# Patient Record
Sex: MALE | Race: WHITE | HISPANIC OR LATINO | Employment: FULL TIME | URBAN - METROPOLITAN AREA
[De-identification: names, ages, dates, MRNs, and addresses within clinical notes are randomized per-mention and may not be internally consistent; named-entity substitution may affect disease eponyms.]

---

## 2019-09-17 ENCOUNTER — OFFICE VISIT (OUTPATIENT)
Dept: URGENT CARE | Facility: MEDICAL CENTER | Age: 34
End: 2019-09-17
Payer: COMMERCIAL

## 2019-09-17 ENCOUNTER — HOSPITAL ENCOUNTER (EMERGENCY)
Facility: HOSPITAL | Age: 34
Discharge: HOME | End: 2019-09-17
Attending: EMERGENCY MEDICINE | Admitting: EMERGENCY MEDICINE
Payer: COMMERCIAL

## 2019-09-17 ENCOUNTER — APPOINTMENT (EMERGENCY)
Dept: CT IMAGING | Facility: HOSPITAL | Age: 34
End: 2019-09-17
Payer: COMMERCIAL

## 2019-09-17 VITALS
WEIGHT: 203.6 LBS | HEART RATE: 87 BPM | TEMPERATURE: 98.4 F | RESPIRATION RATE: 16 BRPM | BODY MASS INDEX: 30.86 KG/M2 | SYSTOLIC BLOOD PRESSURE: 136 MMHG | OXYGEN SATURATION: 98 % | DIASTOLIC BLOOD PRESSURE: 87 MMHG | HEIGHT: 68 IN

## 2019-09-17 VITALS
WEIGHT: 204.8 LBS | BODY MASS INDEX: 31.04 KG/M2 | OXYGEN SATURATION: 98 % | TEMPERATURE: 99.1 F | RESPIRATION RATE: 18 BRPM | HEIGHT: 68 IN | SYSTOLIC BLOOD PRESSURE: 124 MMHG | DIASTOLIC BLOOD PRESSURE: 94 MMHG | HEART RATE: 84 BPM

## 2019-09-17 DIAGNOSIS — R10.31 RIGHT LOWER QUADRANT ABDOMINAL PAIN: Primary | ICD-10-CM

## 2019-09-17 DIAGNOSIS — R11.0 NAUSEA: ICD-10-CM

## 2019-09-17 DIAGNOSIS — R10.9 ABDOMINAL PAIN: Primary | ICD-10-CM

## 2019-09-17 LAB
ALBUMIN SERPL BCP-MCNC: 3.6 G/DL (ref 3.5–5)
ALP SERPL-CCNC: 51 U/L (ref 46–116)
ALT SERPL W P-5'-P-CCNC: 126 U/L (ref 12–78)
ANION GAP SERPL CALCULATED.3IONS-SCNC: 9 MMOL/L (ref 4–13)
AST SERPL W P-5'-P-CCNC: 40 U/L (ref 5–45)
BASOPHILS # BLD AUTO: 0.06 THOUSANDS/ΜL (ref 0–0.1)
BASOPHILS NFR BLD AUTO: 1 % (ref 0–1)
BILIRUB DIRECT SERPL-MCNC: 0.11 MG/DL (ref 0–0.2)
BILIRUB SERPL-MCNC: 0.3 MG/DL (ref 0.2–1)
BILIRUB UR QL STRIP: NEGATIVE
BUN SERPL-MCNC: 17 MG/DL (ref 5–25)
CALCIUM SERPL-MCNC: 8.5 MG/DL (ref 8.3–10.1)
CHLORIDE SERPL-SCNC: 102 MMOL/L (ref 100–108)
CLARITY UR: CLEAR
CO2 SERPL-SCNC: 26 MMOL/L (ref 21–32)
COLOR UR: YELLOW
CREAT SERPL-MCNC: 1.14 MG/DL (ref 0.6–1.3)
EOSINOPHIL # BLD AUTO: 0.07 THOUSAND/ΜL (ref 0–0.61)
EOSINOPHIL NFR BLD AUTO: 1 % (ref 0–6)
ERYTHROCYTE [DISTWIDTH] IN BLOOD BY AUTOMATED COUNT: 12.7 % (ref 11.6–15.1)
GFR SERPL CREATININE-BSD FRML MDRD: 84 ML/MIN/1.73SQ M
GLUCOSE SERPL-MCNC: 112 MG/DL (ref 65–140)
GLUCOSE UR STRIP-MCNC: NEGATIVE MG/DL
HCT VFR BLD AUTO: 51.2 % (ref 36.5–49.3)
HGB BLD-MCNC: 17.2 G/DL (ref 12–17)
HGB UR QL STRIP.AUTO: NEGATIVE
IMM GRANULOCYTES # BLD AUTO: 0.05 THOUSAND/UL (ref 0–0.2)
IMM GRANULOCYTES NFR BLD AUTO: 1 % (ref 0–2)
KETONES UR STRIP-MCNC: ABNORMAL MG/DL
LEUKOCYTE ESTERASE UR QL STRIP: NEGATIVE
LIPASE SERPL-CCNC: 295 U/L (ref 73–393)
LYMPHOCYTES # BLD AUTO: 1.96 THOUSANDS/ΜL (ref 0.6–4.47)
LYMPHOCYTES NFR BLD AUTO: 30 % (ref 14–44)
MCH RBC QN AUTO: 29.3 PG (ref 26.8–34.3)
MCHC RBC AUTO-ENTMCNC: 33.6 G/DL (ref 31.4–37.4)
MCV RBC AUTO: 87 FL (ref 82–98)
MONOCYTES # BLD AUTO: 0.71 THOUSAND/ΜL (ref 0.17–1.22)
MONOCYTES NFR BLD AUTO: 11 % (ref 4–12)
NEUTROPHILS # BLD AUTO: 3.76 THOUSANDS/ΜL (ref 1.85–7.62)
NEUTS SEG NFR BLD AUTO: 56 % (ref 43–75)
NITRITE UR QL STRIP: NEGATIVE
NRBC BLD AUTO-RTO: 0 /100 WBCS
PH UR STRIP.AUTO: 6 [PH]
PLATELET # BLD AUTO: 189 THOUSANDS/UL (ref 149–390)
PMV BLD AUTO: 10.7 FL (ref 8.9–12.7)
POTASSIUM SERPL-SCNC: 3.6 MMOL/L (ref 3.5–5.3)
PROT SERPL-MCNC: 7 G/DL (ref 6.4–8.2)
PROT UR STRIP-MCNC: NEGATIVE MG/DL
RBC # BLD AUTO: 5.87 MILLION/UL (ref 3.88–5.62)
SODIUM SERPL-SCNC: 137 MMOL/L (ref 136–145)
SP GR UR STRIP.AUTO: 1.02 (ref 1–1.03)
UROBILINOGEN UR QL STRIP.AUTO: 4 E.U./DL
WBC # BLD AUTO: 6.61 THOUSAND/UL (ref 4.31–10.16)

## 2019-09-17 PROCEDURE — 80076 HEPATIC FUNCTION PANEL: CPT | Performed by: EMERGENCY MEDICINE

## 2019-09-17 PROCEDURE — 80048 BASIC METABOLIC PNL TOTAL CA: CPT | Performed by: EMERGENCY MEDICINE

## 2019-09-17 PROCEDURE — 99285 EMERGENCY DEPT VISIT HI MDM: CPT | Performed by: EMERGENCY MEDICINE

## 2019-09-17 PROCEDURE — 96375 TX/PRO/DX INJ NEW DRUG ADDON: CPT

## 2019-09-17 PROCEDURE — 36415 COLL VENOUS BLD VENIPUNCTURE: CPT | Performed by: EMERGENCY MEDICINE

## 2019-09-17 PROCEDURE — 81003 URINALYSIS AUTO W/O SCOPE: CPT | Performed by: EMERGENCY MEDICINE

## 2019-09-17 PROCEDURE — 96361 HYDRATE IV INFUSION ADD-ON: CPT

## 2019-09-17 PROCEDURE — 96374 THER/PROPH/DIAG INJ IV PUSH: CPT

## 2019-09-17 PROCEDURE — 99284 EMERGENCY DEPT VISIT MOD MDM: CPT

## 2019-09-17 PROCEDURE — 74177 CT ABD & PELVIS W/CONTRAST: CPT

## 2019-09-17 PROCEDURE — 83690 ASSAY OF LIPASE: CPT | Performed by: EMERGENCY MEDICINE

## 2019-09-17 PROCEDURE — 99213 OFFICE O/P EST LOW 20 MIN: CPT | Performed by: PHYSICIAN ASSISTANT

## 2019-09-17 PROCEDURE — 85025 COMPLETE CBC W/AUTO DIFF WBC: CPT | Performed by: EMERGENCY MEDICINE

## 2019-09-17 RX ORDER — ONDANSETRON 2 MG/ML
4 INJECTION INTRAMUSCULAR; INTRAVENOUS ONCE
Status: COMPLETED | OUTPATIENT
Start: 2019-09-17 | End: 2019-09-17

## 2019-09-17 RX ORDER — KETOROLAC TROMETHAMINE 30 MG/ML
15 INJECTION, SOLUTION INTRAMUSCULAR; INTRAVENOUS ONCE
Status: COMPLETED | OUTPATIENT
Start: 2019-09-17 | End: 2019-09-17

## 2019-09-17 RX ORDER — HYDROMORPHONE HCL/PF 1 MG/ML
0.5 SYRINGE (ML) INJECTION ONCE
Status: COMPLETED | OUTPATIENT
Start: 2019-09-17 | End: 2019-09-17

## 2019-09-17 RX ORDER — ONDANSETRON 4 MG/1
4 TABLET, ORALLY DISINTEGRATING ORAL EVERY 8 HOURS PRN
Qty: 12 TABLET | Refills: 0 | Status: SHIPPED | OUTPATIENT
Start: 2019-09-17 | End: 2019-09-20

## 2019-09-17 RX ORDER — DICYCLOMINE HCL 20 MG
20 TABLET ORAL EVERY 6 HOURS
Qty: 20 TABLET | Refills: 0 | Status: SHIPPED | OUTPATIENT
Start: 2019-09-17 | End: 2019-09-22

## 2019-09-17 RX ADMIN — KETOROLAC TROMETHAMINE 15 MG: 30 INJECTION, SOLUTION INTRAMUSCULAR at 20:23

## 2019-09-17 RX ADMIN — IOHEXOL 87 ML: 350 INJECTION, SOLUTION INTRAVENOUS at 21:15

## 2019-09-17 RX ADMIN — ONDANSETRON 4 MG: 2 INJECTION INTRAMUSCULAR; INTRAVENOUS at 20:23

## 2019-09-17 RX ADMIN — HYDROMORPHONE HYDROCHLORIDE 0.5 MG: 1 INJECTION, SOLUTION INTRAMUSCULAR; INTRAVENOUS; SUBCUTANEOUS at 20:24

## 2019-09-17 RX ADMIN — SODIUM CHLORIDE 1000 ML: 0.9 INJECTION, SOLUTION INTRAVENOUS at 20:23

## 2019-09-17 NOTE — PROGRESS NOTES
[address]        NAME: Pamala Cushing is a 35 y o  male  : 1985    MRN: [id number]  DATE: 2019  TIME: 3:50 PM    Assessment and Plan   Right lower quadrant abdominal pain [R10 31]  1  Right lower quadrant abdominal pain  Transfer to other facility     Patient sent to ER for further evaluation  Concern for possible appendicitis  Will go to [facility] ER  Patient Instructions     Pt sent to ER for further evaluation    Chief Complaint     Chief Complaint   Patient presents with    Abdominal Pain     Lower right quadrant pain x 3 days  Nausea yesterday  History of Present Illness         Patient is a 70-year-old male who comes in today with complaints of right lower quadrant abdominal pain for the past 2 days  He states he did have nausea yesterday but no vomiting  Denies fever  He denies any diarrhea or constipation  Has not had any hematochezia  No urinary symptoms including dysuria, flank pain, hematuria  Only medical problem is asthma  Appendix still intact  Review of Systems   Review of Systems   Constitutional: Negative for fever  Respiratory: Negative for shortness of breath  Cardiovascular: Negative for chest pain  Gastrointestinal: Positive for abdominal pain and nausea  Negative for blood in stool, diarrhea and vomiting  Genitourinary: Negative for difficulty urinating, dysuria, flank pain and hematuria  Musculoskeletal: Positive for back pain  Skin: Negative for color change and rash  Current Medications     No current outpatient medications on file      Current Allergies     Allergies as of 2019    (No Known Allergies)            The following portions of the patient's history were reviewed and updated as appropriate: allergies, current medications, past family history, past medical history, past social history, past surgical history and problem list      Past Medical History:   Diagnosis Date    Asthma        History reviewed  No pertinent surgical history  Family History   Problem Relation Age of Onset    Heart disease Mother     No Known Problems Father          Medications have been verified  Objective   /94   Pulse 84   Temp 99 1 °F (37 3 °C) (Temporal)   Resp 18   Ht 5' 8" (1 727 m)   Wt 92 9 kg (204 lb 12 8 oz)   SpO2 98%   BMI 31 14 kg/m²        Physical Exam     Physical Exam   Constitutional: He appears well-developed and well-nourished  HENT:   Head: Normocephalic and atraumatic  Mouth/Throat: Oropharynx is clear and moist    Cardiovascular: Normal rate, regular rhythm and intact distal pulses  Pulmonary/Chest: Effort normal and breath sounds normal    Abdominal: Soft  Normal appearance  He exhibits no distension and no abdominal bruit  Bowel sounds are decreased  There is tenderness in the right lower quadrant  There is guarding and tenderness at McBurney's point  There is no rigidity  Skin: Skin is warm and dry

## 2019-09-18 NOTE — ED NOTES
Patient transported to [address] [place] [address] [place], [facility]  09/17/19 2747

## 2019-09-18 NOTE — ED PROVIDER NOTES
History  Chief Complaint   Patient presents with    Abdominal Pain     Pt presents to ED with RLQ abdominal pain and nausea since sunday      35year-old male without past medical surgical history here for evaluation of abdominal pain  Patient reports that on Sunday woke up with a pain in his lower abdomen more on the right side is nonradiating he states that is been persistent but does wax and wane it is worse with food he notes that yesterday he had nausea and vomiting although it has improved although the pain has been intermittent but persists, he presents urgent care instructed to the emergency department for further evaluation the pain is poorly localized in his right lower quadrant, he does not believe that is there at this moment, again is nonradiating without other exacerbating remitting factors and no other associated symptoms other than what is noted  He has no fevers no recent viral symptoms he has no headache chest pain cough shortness of breath no subsequent nausea or vomiting he denies having anorexia change in bowels or bladder urinary symptoms testicle pain swelling joint pain swelling rashes prior abdominal surgeries other associated symptoms  Complete review systems otherwise negative as noted          None       Past Medical History:   Diagnosis Date    Asthma        History reviewed  No pertinent surgical history  Family History   Problem Relation Age of Onset    Heart disease Mother     No Known Problems Father      I have reviewed and agree with the history as documented  Social History     Tobacco Use    Smoking status: Never Smoker    Smokeless tobacco: Never Used   Substance Use Topics    Alcohol use: Never     Frequency: Never    Drug use: Never        Review of Systems   Constitutional: Negative for activity change, appetite change, chills and fever  HENT: Negative for rhinorrhea and sore throat  Eyes: Negative for photophobia and pain     Respiratory: Negative for cough and shortness of breath  Cardiovascular: Negative for chest pain and palpitations  Gastrointestinal: Positive for abdominal pain, nausea and vomiting  Negative for abdominal distention, constipation and diarrhea  Genitourinary: Negative for dysuria, frequency, scrotal swelling, testicular pain and urgency  Musculoskeletal: Negative for arthralgias, back pain and myalgias  Skin: Negative for color change and rash  Neurological: Negative for dizziness, weakness, light-headedness and headaches  Hematological: Negative for adenopathy  Does not bruise/bleed easily  Psychiatric/Behavioral: Negative for agitation and behavioral problems  All other systems reviewed and are negative  Physical Exam  Physical Exam   Constitutional: He is oriented to person, place, and time  He appears well-developed and well-nourished  No distress  Very well-appearing no acute distress   HENT:   Head: Normocephalic and atraumatic  Eyes: Pupils are equal, round, and reactive to light  EOM are normal    Neck: Normal range of motion  Neck supple  No tracheal deviation present  Cardiovascular: Normal rate, regular rhythm and normal heart sounds  Exam reveals no gallop and no friction rub  No murmur heard  Pulmonary/Chest: Effort normal and breath sounds normal  He has no wheezes  He has no rales  Abdominal: Soft  Bowel sounds are normal  He exhibits no distension  There is tenderness  There is no rebound and no guarding  A mild poorly reproducible tenderness the right lower quadrant only no right upper quadrant tenderness negative Garcia sign no rebound or guarding no flank or CVA tenderness is abdomen is soft nondistended   Musculoskeletal: Normal range of motion  He exhibits no edema or tenderness  Neurological: He is alert and oriented to person, place, and time  No cranial nerve deficit  He exhibits normal muscle tone  Coordination normal    Skin: Skin is warm and dry  No rash noted     Psychiatric: He has a normal mood and affect  His behavior is normal    Nursing note and vitals reviewed        Vital Signs  ED Triage Vitals [09/17/19 1854]   Temperature Pulse Respirations Blood Pressure SpO2   98 4 °F (36 9 °C) 96 18 144/82 97 %      Temp Source Heart Rate Source Patient Position - Orthostatic VS BP Location FiO2 (%)   Oral Monitor Sitting Left arm --      Pain Score       6           Vitals:    09/17/19 1854 09/17/19 2207   BP: 144/82 136/87   Pulse: 96 87   Patient Position - Orthostatic VS: Sitting Lying         Visual Acuity      ED Medications  Medications   sodium chloride 0 9 % bolus 1,000 mL (0 mL Intravenous Stopped 9/17/19 2205)   ondansetron (ZOFRAN) injection 4 mg (4 mg Intravenous Given 9/17/19 2023)   ketorolac (TORADOL) injection 15 mg (15 mg Intravenous Given 9/17/19 2023)   HYDROmorphone (DILAUDID) injection 0 5 mg (0 5 mg Intravenous Given 9/17/19 2024)   iohexol (OMNIPAQUE) 350 MG/ML injection (MULTI-DOSE) 100 mL (87 mL Intravenous Given 9/17/19 2115)       Diagnostic Studies  Results Reviewed     Procedure Component Value Units Date/Time    UA w Reflex to Microscopic w Reflex to Culture [[id number]]  (Abnormal) Collected:  09/17/19 2111    Lab Status:  Final result Specimen:  Urine, Clean Catch Updated:  09/17/19 2118     Color, UA Yellow     Clarity, UA Clear     Specific Gravity, UA 1 025     pH, UA 6 0     Leukocytes, UA Negative     Nitrite, UA Negative     Protein, UA Negative mg/dl      Glucose, UA Negative mg/dl      Ketones, UA Trace mg/dl      Urobilinogen, UA 4 0 E U /dl      Bilirubin, UA Negative     Blood, UA Negative    Basic metabolic panel [[id number]] Collected:  09/17/19 2008    Lab Status:  Final result Specimen:  Blood from Arm, Right Updated:  09/17/19 2036     Sodium 137 mmol/L      Potassium 3 6 mmol/L      Chloride 102 mmol/L      CO2 26 mmol/L      ANION GAP 9 mmol/L      BUN 17 mg/dL      Creatinine 1 14 mg/dL      Glucose 112 mg/dL      Calcium 8 5 mg/dL      eGFR 80 ml/min/1 73sq m     Narrative:       National Kidney Disease Foundation guidelines for Chronic Kidney Disease (CKD):     Stage 1 with normal or high GFR (GFR > 90 mL/min/1 73 square meters)    Stage 2 Mild CKD (GFR = 60-89 mL/min/1 73 square meters)    Stage 3A Moderate CKD (GFR = 45-59 mL/min/1 73 square meters)    Stage 3B Moderate CKD (GFR = 30-44 mL/min/1 73 square meters)    Stage 4 Severe CKD (GFR = 15-29 mL/min/1 73 square meters)    Stage 5 End Stage CKD (GFR <15 mL/min/1 73 square meters)  Note: GFR calculation is accurate only with a steady state creatinine    Hepatic function panel [[id number]]  (Abnormal) Collected:  09/17/19 2008    Lab Status:  Final result Specimen:  Blood from Arm, Right Updated:  09/17/19 2036     Total Bilirubin 0 30 mg/dL      Bilirubin, Direct 0 11 mg/dL      Alkaline Phosphatase 51 U/L      AST 40 U/L       U/L      Total Protein 7 0 g/dL      Albumin 3 6 g/dL     Lipase [[id number]]  (Normal) Collected:  09/17/19 2008    Lab Status:  Final result Specimen:  Blood from Arm, Right Updated:  09/17/19 2036     Lipase 295 u/L     CBC and differential [[id number]]  (Abnormal) Collected:  09/17/19 2008    Lab Status:  Final result Specimen:  Blood from Arm, Right Updated:  09/17/19 2016     WBC 6 61 Thousand/uL      RBC 5 87 Million/uL      Hemoglobin 17 2 g/dL      Hematocrit 51 2 %      MCV 87 fL      MCH 29 3 pg      MCHC 33 6 g/dL      RDW 12 7 %      MPV 10 7 fL      Platelets 671 Thousands/uL      nRBC 0 /100 WBCs      Neutrophils Relative 56 %      Immat GRANS % 1 %      Lymphocytes Relative 30 %      Monocytes Relative 11 %      Eosinophils Relative 1 %      Basophils Relative 1 %      Neutrophils Absolute 3 76 Thousands/µL      Immature Grans Absolute 0 05 Thousand/uL      Lymphocytes Absolute 1 96 Thousands/µL      Monocytes Absolute 0 71 Thousand/µL      Eosinophils Absolute 0 07 Thousand/µL      Basophils Absolute 0 06 Thousands/µL                  CT abdomen pelvis with contrast   Final Result by Laila Stein MD (09/17 2133)         1  Normal retrocecal appendix  No evidence of bowel obstruction, colitis or diverticulitis  2   No pyelonephritis or obstructive uropathy  Workstation performed: [id number]                    Procedures  Procedures       ED Course  ED Course as of Sep 17 2211   Tue Sep 17, 2019   2152 Please clinically very well he has an atypical story was asymptomatic earlier today he has minimal poorly  reproducible tenderness, he has had 3 days of symptoms his appendix was visualized without inflammation or other acute findings he has a normal white blood cell count normal vital signs he has no nausea vomiting or anorexia currently,  he had no anorexia today was tolerating p o  He has no other GI or  symptoms he is clinically well reviewed findings with patient he has no right upper quadrant pain, will discharge with symptom control close return follow-up instructions patient agreeable plan                                  MDM    Disposition  Final diagnoses:   Abdominal pain   Nausea     Time reflects when diagnosis was documented in both MDM as applicable and the Disposition within this note     Time User Action Codes Description Comment    9/17/2019  9:54 PM Trevor KHAN Add [R10 9] Abdominal pain     9/17/2019  9:54 PM Nataliya Mccollum Add [R11 0] Nausea       ED Disposition     ED Disposition Condition Date/Time Comment    Discharge Stable Tue Sep 17, 2019  9:53 PM Johnathan Huston discharge to home/self care              Follow-up Information     Follow up With Specialties Details Why Contact Info Additional Information    [facility] Emergency Department Emergency Medicine  If symptoms worsen [address] [facility] [id number]  [address] [place] [address] ED, [address] [place] [address], [address] [place] [address] MD [facility] In 3 days As needed [address] [phone]  [phone]             Discharge Medication List as of 9/17/2019  9:55 PM      START taking these medications    Details   dicyclomine (BENTYL) 20 mg tablet Take 1 tablet (20 mg total) by mouth every 6 (six) hours for 5 days, Starting Tue 9/17/2019, Until Sun 9/22/2019, Print      ondansetron (ZOFRAN-ODT) 4 mg disintegrating tablet Take 1 tablet (4 mg total) by mouth every 8 (eight) hours as needed for nausea for up to 3 days, Starting Tue 9/17/2019, Until Fri 9/20/2019, Print           No discharge procedures on file      ED Provider  Electronically Signed by           Gary Rodriguez DO  09/17/19 6591

## 2024-09-13 ENCOUNTER — APPOINTMENT (EMERGENCY)
Dept: RADIOLOGY | Facility: HOSPITAL | Age: 39
End: 2024-09-13
Payer: COMMERCIAL

## 2024-09-13 ENCOUNTER — HOSPITAL ENCOUNTER (EMERGENCY)
Facility: HOSPITAL | Age: 39
Discharge: HOME | End: 2024-09-13
Attending: EMERGENCY MEDICINE
Payer: COMMERCIAL

## 2024-09-13 VITALS
OXYGEN SATURATION: 97 % | DIASTOLIC BLOOD PRESSURE: 87 MMHG | HEART RATE: 85 BPM | TEMPERATURE: 98.2 F | WEIGHT: 204.15 LBS | RESPIRATION RATE: 18 BRPM | SYSTOLIC BLOOD PRESSURE: 137 MMHG | BODY MASS INDEX: 31.04 KG/M2

## 2024-09-13 DIAGNOSIS — M79.672 LEFT FOOT PAIN: Primary | ICD-10-CM

## 2024-09-13 PROCEDURE — 99283 EMERGENCY DEPT VISIT LOW MDM: CPT

## 2024-09-13 PROCEDURE — 99284 EMERGENCY DEPT VISIT MOD MDM: CPT | Performed by: EMERGENCY MEDICINE

## 2024-09-13 PROCEDURE — 73630 X-RAY EXAM OF FOOT: CPT

## 2024-09-14 NOTE — ED PROVIDER NOTES
1. Left foot pain      ED Disposition       ED Disposition   Discharge    Condition   Stable    Date/Time   Fri Sep 13, 2024  7:46 PM    Comment   Dread Hayden discharge to home/self care.                   Assessment & Plan       Medical Decision Making  30-year-old male coming in with left foot pain after he tripped and came down hard and landed awkwardly on his left foot and has been having heel pain since then.  Has hard time putting pressure on his heel.  Does have history of plantar fasciitis in the past.  He has no pain in the ankle or more proximally.  No pain in the distal foot.  Will get x-ray to evaluate for potential fracture we will give him a boot and crutches for ambulating.  Will have patient follow-up with podiatry.    Amount and/or Complexity of Data Reviewed  Radiology: ordered.     Details: No obvious fracture.  Discussion of management or test interpretation with external provider(s): Sent images and discussion with Ortho.  Recommend follow-up.  Recommend boot and crutches and no obvious fracture.                  ED Course as of 09/13/24 2310   Fri Sep 13, 2024   1925 Sent message to ortho about pt       Medications - No data to display    History of Present Illness         History provided by:  Patient  Injury  Location:  Left heel  Quality:  Aching and sore  Severity:  Moderate  Onset quality:  Sudden  Duration:  6 hours  Timing:  Constant  Progression:  Unchanged  Chronicity:  New  Context:  Pt was walking and accidentally tripped over a curb. Did not twist his ankle or fall, but came down hard and awkward on his left foot/heel and has had pain and difficulty putting weight on heel since. Can walk on toes. No pain in ankle/leg/toes. Does have h/o plantar fasciitis.  Relieved by:  Nothing  Worsened by:  Bearing weight  Ineffective treatments:  None          Review of Systems   All other systems reviewed and are negative.          Objective     ED Triage Vitals [09/13/24 1810]   Temperature  Pulse Blood Pressure Respirations SpO2 Patient Position - Orthostatic VS   98.2 °F (36.8 °C) 85 137/87 18 97 % Sitting      Temp Source Heart Rate Source BP Location FiO2 (%) Pain Score    Oral Monitor Left arm -- --        Physical Exam  Vitals reviewed.   HENT:      Head: Normocephalic and atraumatic.   Musculoskeletal:      Left lower leg: No tenderness.      Left ankle: No deformity. No tenderness. Normal range of motion.      Left Achilles Tendon: No tenderness or defects.      Left foot: Normal range of motion and normal capillary refill. Tenderness present. No swelling, deformity or laceration. Normal pulse.        Feet:    Neurological:      Mental Status: He is alert.         Labs Reviewed - No data to display  XR foot 3+ views LEFT    (Results Pending)       Procedures       Melida Martinez MD  09/13/24 6658

## 2024-10-01 VITALS
RESPIRATION RATE: 18 BRPM | WEIGHT: 202.6 LBS | OXYGEN SATURATION: 97 % | TEMPERATURE: 98.1 F | BODY MASS INDEX: 30.71 KG/M2 | HEART RATE: 77 BPM | DIASTOLIC BLOOD PRESSURE: 82 MMHG | SYSTOLIC BLOOD PRESSURE: 126 MMHG | HEIGHT: 68 IN

## 2024-10-01 DIAGNOSIS — S90.32XA CONTUSION OF FOOT OR HEEL, LEFT, INITIAL ENCOUNTER: Primary | ICD-10-CM

## 2024-10-01 DIAGNOSIS — M72.2 PLANTAR FASCIITIS, LEFT: ICD-10-CM

## 2024-10-01 PROCEDURE — 99203 OFFICE O/P NEW LOW 30 MIN: CPT | Performed by: FAMILY MEDICINE

## 2024-10-01 RX ORDER — ALBUTEROL SULFATE 90 UG/1
2 INHALANT RESPIRATORY (INHALATION) EVERY 4 HOURS PRN
COMMUNITY
Start: 2024-09-20 | End: 2025-09-20

## 2024-10-01 RX ORDER — IBUPROFEN 800 MG/1
800 TABLET, FILM COATED ORAL 3 TIMES DAILY PRN
COMMUNITY
Start: 2024-09-20 | End: 2025-01-05

## 2024-10-01 RX ORDER — CYCLOBENZAPRINE HCL 10 MG
10 TABLET ORAL 2 TIMES DAILY PRN
COMMUNITY
Start: 2024-09-20 | End: 2025-01-18

## 2024-10-01 RX ORDER — MONTELUKAST SODIUM 10 MG/1
10 TABLET ORAL
COMMUNITY
Start: 2024-09-20 | End: 2025-03-19

## 2024-10-01 RX ORDER — ALBUTEROL SULFATE 1.25 MG/3ML
2.5 SOLUTION RESPIRATORY (INHALATION) EVERY 4 HOURS PRN
COMMUNITY
Start: 2024-09-20 | End: 2024-10-20

## 2024-10-01 NOTE — PROGRESS NOTES
"Subjective:    Chief Complaint   Patient presents with    Left Foot - Pain       Dread Hayden is a 38 y.o. male complains of left heel pain. Onset of the symptoms was  has had chronic issues with plantar fascitis however recently missed step off curb and landed with direct pressure onto his heel .  Mechanism of injury:  direct heel strik . Aggravating factors: none. Treatment to date: ice and rest. Symptoms have essentially resolved.    The following portions of the patient's history were reviewed and updated as appropriate: allergies, current medications, past family history, past medical history, past social history, past surgical history and problem list.    Occupation:      Review of Systems   Constitutional: Negative for fever.   HENT: Negative for dental problem and headaches.    Eyes: Negative for vision loss.   Respiratory: Negative for cough and shortness of breath.    Cardiovascular: Negative for leg swelling and palpitations.   Gastrointestinal: Negative for constipation and diarrhea.   Genitourinary: Negative for bladder incontinence and difficulty urinating.   Musculoskeletal: Negative for back pain and difficulty walking.   Skin: Negative for rash and ulcer.   Neurological: Negative for dizziness and headaches.   Hem/Lymph/Immuno: Negative for blood clots. Does not bruise/bleed easily.   Psychiatric/Behavioral: Negative for confusion.         Objective:  /82   Pulse 77   Temp 98.1 °F (36.7 °C)   Resp 18   Ht 5' 8\" (1.727 m)   Wt 91.9 kg (202 lb 9.6 oz)   SpO2 97%   BMI 30.81 kg/m²     General: Awake, alert, and oriented x3 in no apparent distress.  Skin: no rashes, lesions, skin discolorations, lacerations, ulcerations  Vasculature: DP palpated. PT palpated. Capillary refill normal  Neurologic: Neurologic exam is normal throughout lower extremities 5/5  Lymph: No palpable or visible regional lymphadenopathy  Neuro: Achilles/Patella DTR's are brisk and symmetrical  Musculoskeletal:left " foot  Inspection: edema negative ecchymosis negative, effusion negative  Palpation: TTP over insertion of plantar fascia  ROM: full  plantarflexion. Limited dorsiflexion  Special test: negativefulcrum  Negative calcaneal squeeze  Normal gait      Imaging:  XR foot 3+ views LEFT    Result Date: 9/14/2024  Narrative: XR FOOT 3+ VW LEFT INDICATION: heel pain. COMPARISON: None FINDINGS: No acute fracture or dislocation. No significant degenerative changes. No lytic or blastic osseous lesion. Unremarkable soft tissues.     Impression: No acute osseous abnormality. Computerized Assisted Algorithm (CAA) may have been used to analyze all applicable images. Workstation performed: [id number]        Assessment/Plan:  1. Plantar Fasciitis-   Arch support orthotic recommended  Recommending PT-referral placed  Return in 8 weeks for re-evaluation. Can consider CSI if no improvement.